# Patient Record
Sex: MALE | Race: WHITE | NOT HISPANIC OR LATINO | Employment: FULL TIME | URBAN - METROPOLITAN AREA
[De-identification: names, ages, dates, MRNs, and addresses within clinical notes are randomized per-mention and may not be internally consistent; named-entity substitution may affect disease eponyms.]

---

## 2017-07-13 ENCOUNTER — TRANSCRIBE ORDERS (OUTPATIENT)
Dept: ADMINISTRATIVE | Facility: HOSPITAL | Age: 47
End: 2017-07-13

## 2017-07-13 DIAGNOSIS — M71.559: Primary | ICD-10-CM

## 2017-07-28 ENCOUNTER — HOSPITAL ENCOUNTER (OUTPATIENT)
Dept: RADIOLOGY | Facility: HOSPITAL | Age: 47
Discharge: HOME | End: 2017-07-28
Attending: ORTHOPAEDIC SURGERY | Admitting: RADIOLOGY
Payer: COMMERCIAL

## 2017-07-28 DIAGNOSIS — M71.559: ICD-10-CM

## 2017-07-28 PROCEDURE — 77002 NEEDLE LOCALIZATION BY XRAY: CPT

## 2017-07-28 PROCEDURE — 20610 DRAIN/INJ JOINT/BURSA W/O US: CPT

## 2017-07-28 RX ORDER — METHYLPREDNISOLONE ACETATE 80 MG/ML
80 INJECTION, SUSPENSION INTRA-ARTICULAR; INTRALESIONAL; INTRAMUSCULAR; SOFT TISSUE
Status: COMPLETED | OUTPATIENT
Start: 2017-07-28 | End: 2017-07-28

## 2017-07-28 RX ORDER — LIDOCAINE HYDROCHLORIDE 10 MG/ML
20 INJECTION, SOLUTION INFILTRATION; PERINEURAL
Status: COMPLETED | OUTPATIENT
Start: 2017-07-28 | End: 2017-07-28

## 2017-07-28 RX ORDER — BUPIVACAINE HYDROCHLORIDE 2.5 MG/ML
30 INJECTION, SOLUTION EPIDURAL; INFILTRATION; INTRACAUDAL
Status: COMPLETED | OUTPATIENT
Start: 2017-07-28 | End: 2017-07-28

## 2017-07-28 RX ADMIN — BUPIVACAINE HYDROCHLORIDE 2 ML: 2.5 INJECTION, SOLUTION EPIDURAL; INFILTRATION; INTRACAUDAL at 14:02

## 2017-07-28 RX ADMIN — METHYLPREDNISOLONE ACETATE 80 MG: 80 INJECTION, SUSPENSION INTRA-ARTICULAR; INTRALESIONAL; INTRAMUSCULAR; SOFT TISSUE at 15:04

## 2017-07-28 RX ADMIN — LIDOCAINE HYDROCHLORIDE 15 ML: 10 INJECTION, SOLUTION INFILTRATION; PERINEURAL at 14:04

## 2017-07-28 RX ADMIN — IOHEXOL 8 ML: 300 INJECTION, SOLUTION INTRAVENOUS at 14:03

## 2022-10-19 ENCOUNTER — EVALUATION (OUTPATIENT)
Dept: PHYSICAL THERAPY | Facility: CLINIC | Age: 52
End: 2022-10-19
Payer: COMMERCIAL

## 2022-10-19 DIAGNOSIS — M50.00 CERVICAL DISC DISORDER WITH MYELOPATHY: Primary | ICD-10-CM

## 2022-10-19 PROCEDURE — 97110 THERAPEUTIC EXERCISES: CPT | Performed by: PHYSICAL THERAPIST

## 2022-10-19 PROCEDURE — 97162 PT EVAL MOD COMPLEX 30 MIN: CPT | Performed by: PHYSICAL THERAPIST

## 2022-10-19 PROCEDURE — 97112 NEUROMUSCULAR REEDUCATION: CPT | Performed by: PHYSICAL THERAPIST

## 2022-10-19 NOTE — LETTER
2022    Evert Anderson PA-C  [address] [place]    Patient: Jovanni Bravo III   YOB: 1970   Date of Visit: 10/19/2022     Encounter Diagnosis     ICD-10-CM    1  Cervical disc disorder with myelopathy  M50 00        Dear Dr Eulalia Charles: Thank you for your recent referral of Jovanni Bravo III  Please review the attached evaluation summary from Osmin's recent visit  Please verify that you agree with the plan of care by signing the attached order  If you have any questions or concerns, please do not hesitate to call  I sincerely appreciate the opportunity to share in the care of one of your patients and hope to have another opportunity to work with you in the near future  Sincerely,    Wilder Tineo, PT      Referring Provider:      I certify that I have read the below Plan of Care and certify the need for these services furnished under this plan of treatment while under my care  Evert Anderson PA-C  [address] [address]  V[address] Mail          PT Evaluation     Today's date: 10/19/2022  Patient name: Jovanni Bravo III  : 1970  MRN: [id number]  Referring provider: Leilani Devi*  Dx:   Encounter Diagnosis     ICD-10-CM    1  Cervical disc disorder with myelopathy  M50 00        Start Time: 1300  Stop Time: 1400  Total time in clinic (min): 60 minutes    Assessment  Assessment details: Patient presents with noted weakness of the B/L shoulder with R>L  There is limited mobility of the cervical spine be increased pain at end ranges with no overpressure applied  Scapular dyskinesias noted with active R UE movement most notable during abduction  There was noted tenderness with palpation of the R AC joint with mild edema present to this location  Mild resting tremor is present to B/L hands upon examination today   Focused on strengthening of the B/L shoulder complexes with initial therapy interventions with emphasis on R scapular  during aarom  He noted most discomfort located to the anterior and lateral aspects of the R shoulder during active elevation above shoulder level  Impairments: abnormal or restricted ROM, activity intolerance, impaired physical strength, lacks appropriate home exercise program and pain with function    Symptom irritability: moderateUnderstanding of Dx/Px/POC: good   Prognosis: good    Goals  STGs:  Patient will be independent with hep by 2-3 visits  Decrease pain levels by 25% for improved tolerance with adls by 2-4 weeks  Improve cervical rom to wnl for improved tolerance with adls by 2-4 weeks  Improve B/L UE rom to wnl for improved tolerance with adls by 2-4 weeks      LTGs:  Improve FOTO score from 53 to 66 indicating improved tolerance with activities involving the cervical spine and B/L UEs  Patient will be able to return to normal work and recreation without limitation from the cervical spine by discharge  Patient will be able to perform all adls with pain levels not exceeding 2/10 by discharge  Patient will be able to return to normal work duties without limitation from the cervical spine by discharge              Plan  Plan details: Patient informed that from this point forward, to ensure adherence to the aforementioned plan of care, all or some of the treatment may be performed and carried out by a Physical Therapy Assistant (PTA) with supervision from a licensed Physical Therapist (PT) in accordance with [place] [address]  Patient will continue to benefit from skilled physical therapy to address the functional deficits that were identified during the evaluation today  We will continue to progress the therapy program to address these functional deficits and achieve the established goals             Patient would benefit from: skilled physical therapy  Planned modality interventions: traction and thermotherapy: hydrocollator packs  Planned therapy interventions: ADL retraining, body mechanics training, flexibility, functional ROM exercises, home exercise program, therapeutic exercise, therapeutic activities, stretching, strengthening, postural training, patient education, manual therapy and joint mobilization  Frequency: 1-2x week  Duration in weeks: 6  Plan of Care beginning date: 10/19/2022  Plan of Care expiration date: 2022  Treatment plan discussed with: patient        Subjective Evaluation    History of Present Illness  Date of surgery: 2022  Mechanism of injury: surgery  Mechanism of injury: Patient presents to out patient physical therapy with chief c/o cervical pain  Patient presents to out patient physical therapy s/p C4-6 ACDF  Patient has been experiencing cervical radicular symptoms over the past 10-15 years  He noticed weakness in his arms and difficulties elevating the arms overhead  He has noticed an improvement with the numbness in his hand and improvements with his gait since surgery  He continues to have difficulties elevating his arms and feels weakness when trying to do so  He reports that there is difficulties with sleeping secondary to increased shoulder pain  He is limited with sidelying secondary to increased pain in his shoulders  He also discloses that over the past two weeks he has noticed an increase in headaches     Quality of life: good    Pain  Current pain ratin  At best pain ratin  At worst pain ratin  Location: Cervical  Quality: sharp and radiating  Relieving factors: rest and change in position  Aggravating factors: overhead activity    Social Support  Lives in: multiple-level home  Lives with: spouse    Employment status: working ( - Currently out of work  )  Hand dominance: right    Treatments  Previous treatment: injection treatment  Current treatment: physical therapy  Patient Goals  Patient goals for therapy: increased strength, increased motion and decreased pain  Patient goal: Patient wishes to regain some mobility and strength in his arms           Objective     Neurological Testing     Reflexes   Left   Biceps (C5/C6): normal (2+)  Brachioradialis (C6): normal (2+)  Triceps (C7): trace (1+)    Right   Biceps (C5/C6): trace (1+)  Brachioradialis (C6): trace (1+)  Triceps (C7): trace (1+)    Active Range of Motion   Cervical/Thoracic Spine       Cervical    Flexion: 32 degrees   Extension: 18 degrees      Left lateral flexion: 9 degrees      Right lateral flexion: 14 degrees      Left rotation: 38 degrees  Right rotation: 41 degrees         Left Shoulder   Flexion: 152 degrees   Abduction: 129 degrees     Right Shoulder   Flexion: 99 degrees   Abduction: 101 degrees     Strength/Myotome Testing   Cervical Spine     Left   Interossei strength (t1): 4+ and 4  Neck lateral flexion (C3): 3+    Right   Interossei strength (t1): 4-  Neck lateral flexion (C3): 3+    Left Shoulder     Planes of Motion   Abduction: 4     Right Shoulder     Planes of Motion   Abduction: 4     Left Elbow   Flexion: 4+  Extension: 4+    Right Elbow   Flexion: 4-  Extension: 4+    Left Wrist/Hand   Thumb extension: 4-     (2nd hand position)     Trial 1: 95    Trial 2: 103    Trial 3: 100    Right Wrist/Hand   Thumb extension: 4+     (2nd hand position)     Trial 1: 80    Trial 2: 95    Trial 3: 100             Precautions: R MARTHA, R shoulder sx x1, L shoulder sx x2    Date 10/19/2022       FOTO 53       Manuals        Sidelying ER MRE        Shoulder PROM                        Neuro Re-Ed        Scaption with wand assist 15x       Seated SA Press 5" 10x       Scapular retractions 15x                                       Ther Ex        IR and ER shoulder isometric 5" 10x       Shrug 15x       UBE        Cervical isometrics                                        Ther Activity        Cone stacking                Gait Training                        Modalities

## 2022-10-27 ENCOUNTER — OFFICE VISIT (OUTPATIENT)
Dept: PHYSICAL THERAPY | Facility: CLINIC | Age: 52
End: 2022-10-27
Payer: COMMERCIAL

## 2022-10-27 DIAGNOSIS — M50.00 CERVICAL DISC DISORDER WITH MYELOPATHY: Primary | ICD-10-CM

## 2022-10-27 PROCEDURE — 97530 THERAPEUTIC ACTIVITIES: CPT

## 2022-10-27 PROCEDURE — 97112 NEUROMUSCULAR REEDUCATION: CPT

## 2022-10-27 PROCEDURE — 97140 MANUAL THERAPY 1/> REGIONS: CPT

## 2022-10-27 PROCEDURE — 97110 THERAPEUTIC EXERCISES: CPT

## 2022-10-27 NOTE — PROGRESS NOTES
Daily Note     Today's date: 10/27/2022  Patient name: Pamela Pitts III  : 1970  MRN: [id number]  Referring provider: MERRILL Ball  Dx:   Encounter Diagnosis     ICD-10-CM    1  Cervical disc disorder with myelopathy  M50 00        Start Time: 1300  Stop Time: 1400  Total time in clinic (min): 60 minutes    Subjective:  I am having a rough week , I got the injections in my low back  I have some movement in my neck , but, my shoulders are sore  Objective: See treatment diary below      Assessment: Tolerated treatment fair  Patient would benefit from continued PT  Pt reports having some improvement with his neck motion   He reports having pain in both shoulders during therapy today  We began some new exercises with fair tolerance  He had good passive motion to both shoulders with pain with all   He reports feeling less tight in both shoulders post        Plan: Continue per plan of care        Precautions: R MARTHA, R shoulder sx x1, L shoulder sx x2    Date 10/19/2022 10/27      FOTO 53       Manuals        Sidelying ER MRE  20 x both shoulders      Shoulder PROM  Both shoulders   10 min                      Neuro Re-Ed        Scaption with wand assist 15x 20x      Seated SA Press 5" 10x 5 " 10 x each      Scapular retractions 15x 15 x 3 "                                       Ther Ex        IR and ER shoulder isometric 5" 10x 5 " 10 x       Shrug 15x 20 x       UBE  L 4 /       Cervical isometrics  5 " 5 x all                                       Ther Activity        Cone stacking   4 x  4 cones  B/L              Gait Training                        Modalities

## 2022-11-03 ENCOUNTER — APPOINTMENT (OUTPATIENT)
Dept: PHYSICAL THERAPY | Facility: CLINIC | Age: 52
End: 2022-11-03

## 2022-11-10 ENCOUNTER — OFFICE VISIT (OUTPATIENT)
Dept: PHYSICAL THERAPY | Facility: CLINIC | Age: 52
End: 2022-11-10

## 2022-11-10 DIAGNOSIS — M50.00 CERVICAL DISC DISORDER WITH MYELOPATHY: Primary | ICD-10-CM

## 2022-11-10 NOTE — PROGRESS NOTES
Daily Note     Today's date: 11/10/2022  Patient name: Hua Beck III  : 1970  MRN: [id number]  Referring provider: MERRILL Yeboah  Dx:   Encounter Diagnosis     ICD-10-CM    1  Cervical disc disorder with myelopathy  M50 00        Start Time: 1100  Stop Time: 1150  Total time in clinic (min): 50 minutes    Subjective: I am still having the headaches  I called the Dr and he told me it had nothing to do with what we did  My pain is off and on in my neck and shoulders  I see my DR in December  It has been pretty steady in my neck for the past 2 weeks  Objective: See treatment diary below      Assessment: Tolerated treatment fair  Patient would benefit from continued PT  BP was 115/ 86   Pt had some mild pain through out his program today  We performed ER using the orange bands today  He did well with no increase in shoulder pain  Pt states that his neck and shoulder pain remained at around 4/10 pre and post therapy  He will be seeing the Dr and will let us know what they want to do  Pt had tightness and limited motion in his neck, but, had good motion in both shoulders  He had some tightness with IR in both, but, wanst bad  Plan: Continue per plan of care        Precautions: R MARTHA, R shoulder sx x1, L shoulder sx x2    Date 10/19/2022 10/27 11/10     FOTO 48       Manuals        Sidelying ER MRE  20 x both shoulders Orange band 20 x each  ER     Shoulder PROM  Both shoulders   10 min Both shoulders 10 min                     Neuro Re-Ed        Scaption with wand assist 15x 20x 20 x     Seated SA Press 5" 10x 5 " 10 x each 5 " 10 x each     Scapular retractions 15x 15 x 3 "  20 x 3 "                                      Ther Ex        IR and ER shoulder isometric 5" 10x 5 " 10 x  5 " 10 x     Shrug 15x 20 x  30x      UBE  L 4   L 4       Cervical isometrics  5 " 5 x all  5 " 5 x                                      Ther Activity        Cone stacking   4 x  4 cones  B/L 4 x 4 B/L              Gait Training                        Modalities

## 2024-06-02 ENCOUNTER — APPOINTMENT (OUTPATIENT)
Dept: RADIOLOGY | Facility: CLINIC | Age: 54
End: 2024-06-02
Payer: COMMERCIAL

## 2024-06-02 ENCOUNTER — OFFICE VISIT (OUTPATIENT)
Dept: URGENT CARE | Facility: CLINIC | Age: 54
End: 2024-06-02
Payer: COMMERCIAL

## 2024-06-02 VITALS
SYSTOLIC BLOOD PRESSURE: 135 MMHG | OXYGEN SATURATION: 97 % | DIASTOLIC BLOOD PRESSURE: 86 MMHG | RESPIRATION RATE: 18 BRPM | TEMPERATURE: 98.2 F | HEART RATE: 99 BPM

## 2024-06-02 DIAGNOSIS — M79.672 LEFT FOOT PAIN: ICD-10-CM

## 2024-06-02 DIAGNOSIS — S90.32XA CONTUSION OF LEFT FOOT, INITIAL ENCOUNTER: Primary | ICD-10-CM

## 2024-06-02 PROCEDURE — 73630 X-RAY EXAM OF FOOT: CPT

## 2024-06-02 PROCEDURE — 99203 OFFICE O/P NEW LOW 30 MIN: CPT

## 2024-06-02 RX ORDER — VILAZODONE HYDROCHLORIDE 20 MG/1
20 TABLET ORAL
COMMUNITY
Start: 2024-05-29

## 2024-06-02 RX ORDER — ELECTROLYTES/DEXTROSE
SOLUTION, ORAL ORAL
COMMUNITY

## 2024-06-02 RX ORDER — LORATADINE 10 MG/1
10 CAPSULE, LIQUID FILLED ORAL DAILY
COMMUNITY

## 2024-06-02 RX ORDER — OMEPRAZOLE 20 MG/1
CAPSULE, DELAYED RELEASE ORAL
COMMUNITY
Start: 2024-05-29

## 2024-06-02 RX ORDER — AMLODIPINE BESYLATE 2.5 MG/1
5 TABLET ORAL
COMMUNITY
Start: 2024-05-29

## 2024-06-02 RX ORDER — QUETIAPINE FUMARATE 25 MG/1
25 TABLET, FILM COATED ORAL
COMMUNITY
Start: 2023-12-01 | End: 2024-11-30

## 2024-06-02 RX ORDER — VILAZODONE HYDROCHLORIDE 10 MG/1
1 TABLET ORAL EVERY EVENING
COMMUNITY
Start: 2024-05-29

## 2024-06-02 RX ORDER — MONTELUKAST SODIUM 10 MG/1
10 TABLET ORAL
COMMUNITY
Start: 2024-05-29

## 2024-06-02 NOTE — PROGRESS NOTES
tanya  [facility]        NAME: Osmin Galeas III is a 53 y.o. male  : 1970    MRN: [id number]  DATE: 2024  TIME: 8:49 PM    Assessment and Plan   Contusion of left foot, initial encounter [S90.32XA]  1. Contusion of left foot, initial encounter        2. Left foot pain  XR foot 3+ vw left      Preliminary read shows no fracture or dislocation    Patient Instructions   Price recommendation   Follow up with PCP in 3-5 days.  Proceed to  ER if symptoms worsen.    If tests are performed, our office will contact you with results only if changes need to made to the care plan discussed with you at the visit. [facility]    Chief Complaint     Chief Complaint   Patient presents with    Foot Pain     Pain swelling with ecchymoses to top of left foot over 4th 5th metatarsals distally no open areas weight fell approx 18in on foot at gym on 24         History of Present Illness       Patient reports he dropped a 35 pound weight on his foot yesterday at 4pm        Review of Systems   Review of Systems   Musculoskeletal:  Positive for gait problem and myalgias.        Pain in foot    All other systems reviewed and are negative.        Current Medications       Current Outpatient Medications:     amLODIPine (NORVASC) 2.5 mg tablet, Take 5 mg by mouth, Disp: , Rfl:     montelukast (SINGULAIR) 10 mg tablet, Take 10 mg by mouth, Disp: , Rfl:     omeprazole (PriLOSEC) 20 mg delayed release capsule, , Disp: , Rfl:     QUEtiapine (SEROquel) 25 mg tablet, Take 25 mg by mouth, Disp: , Rfl:     vilazodone (VIIBRYD) 10 mg tablet, Take 1 tablet by mouth every evening, Disp: , Rfl:     vilazodone (VIIBRYD) 20 mg tablet, Take 20 mg by mouth, Disp: , Rfl:     Ascorbic Acid, Vitamin C, (VITAMIN C) 100 MG tablet, Take 100 mg by mouth, Disp: , Rfl:     Loratadine 10 MG CAPS, Take 10 mg by mouth daily, Disp: , Rfl:     Multiple Vitamin (Multivitamin Adult) TABS, Take by mouth,  Disp: , Rfl:     temazepam (RESTORIL) 7.5 mg capsule, Take 15 mg by mouth daily at bedtime as needed for sleep, Disp: , Rfl:     Current Allergies     Allergies as of 06/02/2024 - Reviewed 06/02/2024   Allergen Reaction Noted    Biaxin [clarithromycin]  12/07/2016            The following portions of the patient's history were reviewed and updated as appropriate: allergies, current medications, past family history, past medical history, past social history, past surgical history and problem list.     Past Medical History:   Diagnosis Date    Anxiety     Asthma     Depression     Hypercholesterolemia     TB (tuberculosis)     inactive       Past Surgical History:   Procedure Laterality Date    HAND SURGERY      HIP ARTHROPLASTY      NOSE SURGERY      HI PARTIAL REMOVAL, CLAVICLE Left 12/9/2016    Procedure: OPEN DISTAL CLAVICLE EXCISION;  Surgeon: Brandon Cleary MD;  Location: [facility];  Service: Orthopedics    ROTATOR CUFF REPAIR         No family history on file.      Medications have been verified.        Objective   /86   Pulse 99   Temp 98.2 °F (36.8 °C)   Resp 18   SpO2 97%        Physical Exam     Physical Exam  Vitals and nursing note reviewed.   Constitutional:       Appearance: Normal appearance.   Cardiovascular:      Rate and Rhythm: Normal rate and regular rhythm.      Pulses: Normal pulses.      Heart sounds: Normal heart sounds. No murmur heard.  Pulmonary:      Effort: Pulmonary effort is normal.      Breath sounds: Normal breath sounds. No wheezing, rhonchi or rales.   Musculoskeletal:      Comments: Tenderness to palpation over 4th and 5th metatarsal area of left foot   Neurological:      Mental Status: He is alert.

## 2024-06-02 NOTE — PATIENT INSTRUCTIONS
Price recommendation   Follow up with PCP in 3-5 days.  Proceed to  ER if symptoms worsen.    If tests are performed, our office will contact you with results only if changes need to made to the care plan discussed with you at the visit. You can review your full results on [facility].

## 2025-01-12 ENCOUNTER — OFFICE VISIT (OUTPATIENT)
Dept: URGENT CARE | Facility: CLINIC | Age: 55
End: 2025-01-12
Payer: COMMERCIAL

## 2025-01-12 ENCOUNTER — APPOINTMENT (OUTPATIENT)
Dept: RADIOLOGY | Facility: CLINIC | Age: 55
End: 2025-01-12
Payer: COMMERCIAL

## 2025-01-12 VITALS
SYSTOLIC BLOOD PRESSURE: 122 MMHG | HEART RATE: 78 BPM | OXYGEN SATURATION: 98 % | RESPIRATION RATE: 18 BRPM | TEMPERATURE: 98.2 F | DIASTOLIC BLOOD PRESSURE: 82 MMHG

## 2025-01-12 DIAGNOSIS — M77.8 TENDINITIS OF FINGER OF RIGHT HAND: Primary | ICD-10-CM

## 2025-01-12 DIAGNOSIS — M79.644 PAIN OF RIGHT MIDDLE FINGER: ICD-10-CM

## 2025-01-12 PROCEDURE — 99213 OFFICE O/P EST LOW 20 MIN: CPT

## 2025-01-12 PROCEDURE — 73140 X-RAY EXAM OF FINGER(S): CPT

## 2025-01-12 NOTE — PROGRESS NOTES
[facility]        NAME: Osmin Galeas III is a 54 y.o. male  : 1970    MRN: [id number]  DATE: 2025  TIME: 10:06 AM    Assessment and Plan   Tendinitis of finger of right hand [M77.8]  1. Tendinitis of finger of right hand  XR finger right third digit-middle    Diclofenac Sodium (VOLTAREN) 1 %        Xr- no acute osseous abnormality.  Consistent with trigger finger.  Given medications will use voltaren gel for pain  Static Metal finger splint applied in office.      Patient Instructions     No acute osseous abnormality  Consistent with tendonitis/ trigger finger  Rest the finger ( use splint).  Voltaren gel 4 times a day  Follow with ortho if no improvement.  Follow up with PCP in 3-5 days.  Proceed to  ER if symptoms worsen.    Chief Complaint     Chief Complaint   Patient presents with    Hand Pain     Pt c/o Right hand pain for about a month and hurt it more over this weekend somehow. Hurts bending his fingers back and into a fist- specifically middle finger/ knuckle         History of Present Illness       Patient is a 54 year old male who presents to the office today for right middle and knuckle pain that started about 1 month ago. Has been getting worse over the weekend. Is an  for 30 years. Is right handed. Denies injury. Does go to the gym. Shoveled snow over the weekend but denies it being heavy. Did take some tylenol without relief.     Hand Pain         Review of Systems   Review of Systems   Musculoskeletal:  Positive for arthralgias.   All other systems reviewed and are negative.        Current Medications       Current Outpatient Medications:     amLODIPine (NORVASC) 2.5 mg tablet, Take 5 mg by mouth, Disp: , Rfl:     Ascorbic Acid, Vitamin C, (VITAMIN C) 100 MG tablet, Take 100 mg by mouth, Disp: , Rfl:     Diclofenac Sodium (VOLTAREN) 1 %, Apply 2 g topically 4 (four) times a day, Disp: 50 g, Rfl: 1    Loratadine 10 MG CAPS, Take 10 mg by mouth daily, Disp: ,  Rfl:     montelukast (SINGULAIR) 10 mg tablet, Take 10 mg by mouth, Disp: , Rfl:     Multiple Vitamin (Multivitamin Adult) TABS, Take by mouth, Disp: , Rfl:     omeprazole (PriLOSEC) 20 mg delayed release capsule, , Disp: , Rfl:     temazepam (RESTORIL) 7.5 mg capsule, Take 15 mg by mouth daily at bedtime as needed for sleep, Disp: , Rfl:     vilazodone (VIIBRYD) 10 mg tablet, Take 1 tablet by mouth every evening, Disp: , Rfl:     vilazodone (VIIBRYD) 20 mg tablet, Take 20 mg by mouth, Disp: , Rfl:     Current Allergies     Allergies as of 01/12/2025 - Reviewed 01/12/2025   Allergen Reaction Noted    Clarithromycin Diarrhea and GI Intolerance 12/07/2016            The following portions of the patient's history were reviewed and updated as appropriate: allergies, current medications, past family history, past medical history, past social history, past surgical history and problem list.     Past Medical History:   Diagnosis Date    Anxiety     Asthma     Depression     Hypercholesterolemia     TB (tuberculosis)     inactive       Past Surgical History:   Procedure Laterality Date    HAND SURGERY      HIP ARTHROPLASTY      NOSE SURGERY      FL CLAVICULECTOMY PARTIAL Left 12/9/2016    Procedure: OPEN DISTAL CLAVICLE EXCISION;  Surgeon: Brandon Cleary MD;  Location: [facility];  Service: Orthopedics    ROTATOR CUFF REPAIR         History reviewed. No pertinent family history.      Medications have been verified.        Objective   /82   Pulse 78   Temp 98.2 °F (36.8 °C) (Temporal)   Resp 18   SpO2 98%        Physical Exam     Physical Exam  Vitals and nursing note reviewed.   Constitutional:       Appearance: Normal appearance. He is normal weight.   Cardiovascular:      Rate and Rhythm: Normal rate.      Pulses: Normal pulses.   Pulmonary:      Effort: Pulmonary effort is normal.   Musculoskeletal:         General: Tenderness present.      Right hand: Tenderness present. No swelling, deformity, lacerations or  bony tenderness. Decreased range of motion. Normal strength. Normal sensation. There is no disruption of two-point discrimination. Normal capillary refill. Normal pulse.        Hands:    Skin:     General: Skin is warm.      Capillary Refill: Capillary refill takes less than 2 seconds.   Neurological:      Mental Status: He is alert.

## 2025-01-12 NOTE — PATIENT INSTRUCTIONS
No acute osseous abnormality  Consistent with tendonitis/ trigger finger  Rest the finger ( use splint).  Voltaren gel 4 times a day  Follow with ortho if no improvement.

## 2025-03-18 ENCOUNTER — OFFICE VISIT (OUTPATIENT)
Dept: URGENT CARE | Facility: CLINIC | Age: 55
End: 2025-03-18
Payer: COMMERCIAL

## 2025-03-18 VITALS
HEART RATE: 81 BPM | WEIGHT: 207 LBS | SYSTOLIC BLOOD PRESSURE: 134 MMHG | HEIGHT: 71 IN | OXYGEN SATURATION: 97 % | RESPIRATION RATE: 18 BRPM | TEMPERATURE: 98.2 F | BODY MASS INDEX: 28.98 KG/M2 | DIASTOLIC BLOOD PRESSURE: 68 MMHG

## 2025-03-18 DIAGNOSIS — M54.42 ACUTE BILATERAL LOW BACK PAIN WITH LEFT-SIDED SCIATICA: Primary | ICD-10-CM

## 2025-03-18 PROCEDURE — 99213 OFFICE O/P EST LOW 20 MIN: CPT

## 2025-03-18 RX ORDER — ROSUVASTATIN CALCIUM 10 MG/1
10 TABLET, COATED ORAL
COMMUNITY
Start: 2024-06-06 | End: 2025-06-06

## 2025-03-18 RX ORDER — QUETIAPINE FUMARATE 25 MG/1
TABLET, FILM COATED ORAL
COMMUNITY

## 2025-03-18 RX ORDER — PREDNISONE 10 MG/1
TABLET ORAL
Qty: 21 TABLET | Refills: 0 | Status: SHIPPED | OUTPATIENT
Start: 2025-03-18 | End: 2025-03-24

## 2025-03-18 NOTE — PROGRESS NOTES
"  [facility]        NAME: Osmin Galeas III is a 54 y.o. male  : 1970    MRN: [id number]  DATE: 2025  TIME: 9:34 AM    Assessment and Plan   Acute bilateral low back pain with left-sided sciatica [M54.42]  1. Acute bilateral low back pain with left-sided sciatica  predniSONE 10 mg tablet        Left and right SI area pain and tenderness to palpation. Pain radiates to left buttock and down the back of the left leg above the level of the posterior knee. Pt reports he had a prior back injury in the past and last week on Tuesday (3/11/25) he was lifting open a window and \"tweaked his back.\" He has been taking otc medicines and attempting home remedies without relief. Presented here today requesting steroid taper as he has had in the past and was afforded relief with same and a work note to be able to return to work as he has missed some time due to the back pain. There is no saddle anesthesia, bowel or bladder retention or incontinence or numbness/tingling in the genital area. Pt was instructed to seek care with the ER if this occurs. If he is not improving in 3-5 days he was advised to seek care with his family dr.     Patient Instructions       Follow up with PCP in 3-5 days.  Proceed to  ER if symptoms worsen.    If tests are performed, our office will contact you with results only if changes need to made to the care plan discussed with you at the visit. [facility]    Chief Complaint     Chief Complaint   Patient presents with    Back Pain     Has a history of back injury. Aggravated it when opening a window on Tuesday of last week.   Would like steroids if possible.   Needs a note to return to work on Friday.         History of Present Illness       54-year-old male with past medical history significant for anxiety, asthma, depression, high cholesterol, TB presents to this clinic with complaint of back pain.  Patient reports he has a history of " a back injury.  He aggravated it when opening a window on Tuesday of last week, 3/11/2025.  Patient presents to this clinic requesting steroids if possible.  Needs a note to return to work on Friday.      Review of Systems   Review of Systems   Musculoskeletal:  Positive for arthralgias, back pain and myalgias.         Current Medications       Current Outpatient Medications:     amLODIPine (NORVASC) 2.5 mg tablet, Take 5 mg by mouth, Disp: , Rfl:     Ascorbic Acid, Vitamin C, (VITAMIN C) 100 MG tablet, Take 100 mg by mouth, Disp: , Rfl:     montelukast (SINGULAIR) 10 mg tablet, Take 10 mg by mouth, Disp: , Rfl:     Multiple Vitamin (Multivitamin Adult) TABS, Take by mouth, Disp: , Rfl:     omeprazole (PriLOSEC) 20 mg delayed release capsule, , Disp: , Rfl:     predniSONE 10 mg tablet, Take 6 tablets (60 mg total) by mouth daily for 1 day, THEN 5 tablets (50 mg total) daily for 1 day, THEN 4 tablets (40 mg total) daily for 1 day, THEN 3 tablets (30 mg total) daily for 1 day, THEN 2 tablets (20 mg total) daily for 1 day, THEN 1 tablet (10 mg total) daily for 1 day., Disp: 21 tablet, Rfl: 0    QUEtiapine (SEROquel) 25 mg tablet, TAKE 1 TO 2 TABLETS BY MOUTH NIGHTLY, Disp: , Rfl:     rosuvastatin (CRESTOR) 10 MG tablet, Take 10 mg by mouth, Disp: , Rfl:     vilazodone (VIIBRYD) 10 mg tablet, Take 1 tablet by mouth every evening, Disp: , Rfl:     vilazodone (VIIBRYD) 20 mg tablet, Take 20 mg by mouth, Disp: , Rfl:     Diclofenac Sodium (VOLTAREN) 1 %, Apply 2 g topically 4 (four) times a day (Patient not taking: Reported on 3/18/2025), Disp: 50 g, Rfl: 1    Loratadine 10 MG CAPS, Take 10 mg by mouth daily (Patient not taking: Reported on 3/18/2025), Disp: , Rfl:     temazepam (RESTORIL) 7.5 mg capsule, Take 15 mg by mouth daily at bedtime as needed for sleep (Patient not taking: Reported on 3/18/2025), Disp: , Rfl:     Current Allergies     Allergies as of 03/18/2025 - Reviewed 03/18/2025   Allergen Reaction Noted     "Clarithromycin Diarrhea and GI Intolerance 12/07/2016            The following portions of the patient's history were reviewed and updated as appropriate: allergies, current medications, past family history, past medical history, past social history, past surgical history and problem list.     Past Medical History:   Diagnosis Date    Anxiety     Asthma     Degenerative joint disease (DJD) of lumbar spine     Depression     Hypercholesterolemia     TB (tuberculosis)     inactive       Past Surgical History:   Procedure Laterality Date    HAND SURGERY      HIP ARTHROPLASTY      NOSE SURGERY      ME CLAVICULECTOMY PARTIAL Left 12/9/2016    Procedure: OPEN DISTAL CLAVICLE EXCISION;  Surgeon: Brandon Cleary MD;  Location: [facility];  Service: Orthopedics    ROTATOR CUFF REPAIR         No family history on file.      Medications have been verified.        Objective   /68   Pulse 81   Temp 98.2 °F (36.8 °C)   Resp 18   Ht 5' 11\" (1.803 m)   Wt 93.9 kg (207 lb)   SpO2 97%   BMI 28.87 kg/m²        Physical Exam     Physical Exam  Vitals and nursing note reviewed.   Constitutional:       General: He is not in acute distress.     Appearance: Normal appearance. He is not ill-appearing.   HENT:      Head: Normocephalic and atraumatic.   Cardiovascular:      Rate and Rhythm: Normal rate and regular rhythm.      Pulses: Normal pulses.      Heart sounds: Normal heart sounds.   Pulmonary:      Effort: Pulmonary effort is normal.      Breath sounds: Normal breath sounds.   Musculoskeletal:         General: Normal range of motion.      Cervical back: Normal, normal range of motion and neck supple.      Thoracic back: Normal.      Lumbar back: Tenderness present. No swelling, edema, deformity, signs of trauma, lacerations or spasms.      Comments: Pt is able to sit and lift both left and right thighs off the floor without pain in the lower back; able to flex and extend legs without increased pain in the lower back; tender " to palpation in left and right SI joint spaces. No muscle spasm present on palpation. No discoloration or deformity.    Skin:     General: Skin is warm and dry.      Capillary Refill: Capillary refill takes less than 2 seconds.   Neurological:      General: No focal deficit present.      Mental Status: He is alert and oriented to person, place, and time.

## 2025-03-18 NOTE — PATIENT INSTRUCTIONS
Take the prednisone (steroid) each day. Take the total days pills that are due and take them with breakfast or lunch each day    No NSAID medicines while taking the prednisone for the next 6 days (ibuprofen, advil, aleve, motrin, Celebrex, naproxen, diclofenac). Taking these medicines together or in conjunction increases your chances of developing a peptic ulcer in the stomach (bleeding ulcer)    You may take Tylenol over the counter if needed for breakthrough pain as directed on packaging of product    Ice or heat to the back every 2-3 hours as needed for pain relief. DO NOT leave in place for more than 20 minutes each application    Topical pain agents of choice to assist with pain relief (muscle rubs, patches, gels, creams, sprays). If you use any of these agents please wash the area before applying heat as you can burn your skin if you use a topical pain agent and heat together. Always wash and dry the area before applying heat.     Follow up with your family dr in 3-5 days if your symptoms are failing to improve.

## 2025-03-18 NOTE — LETTER
March 18, 2025     Patient: Osmin Galeas III  YOB: 1970  Date of Visit: 3/18/2025      To Whom it May Concern:    Osmin Galeas is under my professional care. Osmin was seen in my office on 3/18/2025. Osmin may return to work on 3/21/25 .    If you have any questions or concerns, please don't hesitate to call.         Sincerely,          UMA Carter        CC: No Recipients